# Patient Record
Sex: MALE | URBAN - METROPOLITAN AREA
[De-identification: names, ages, dates, MRNs, and addresses within clinical notes are randomized per-mention and may not be internally consistent; named-entity substitution may affect disease eponyms.]

---

## 2021-05-04 ENCOUNTER — OFFICE VISIT (OUTPATIENT)
Dept: URBAN - METROPOLITAN AREA CLINIC 7 | Facility: CLINIC | Age: 66
End: 2021-05-04
Payer: COMMERCIAL

## 2021-05-04 PROCEDURE — 92235 FLUORESCEIN ANGRPH MLTIFRAME: CPT | Performed by: OPHTHALMOLOGY

## 2021-05-04 PROCEDURE — 99204 OFFICE O/P NEW MOD 45 MIN: CPT | Performed by: OPHTHALMOLOGY

## 2021-05-04 PROCEDURE — 92134 CPTRZ OPH DX IMG PST SGM RTA: CPT | Performed by: OPHTHALMOLOGY

## 2021-05-04 RX ORDER — PREDNISOLONE ACETATE 10 MG/ML
1 % SUSPENSION/ DROPS OPHTHALMIC
Qty: 5 | Refills: 3 | Status: ACTIVE
Start: 2021-05-04

## 2021-05-04 RX ORDER — KETOROLAC TROMETHAMINE 5 MG/ML
0.5 % SOLUTION OPHTHALMIC
Qty: 5 | Refills: 3 | Status: INACTIVE
Start: 2021-05-04 | End: 2021-06-15

## 2021-05-04 ASSESSMENT — INTRAOCULAR PRESSURE
OD: 20
OS: 19

## 2021-05-04 NOTE — IMPRESSION/PLAN
Impression: Retinal hemorrhage, left eye: H35.62.  Plan: small juxtafoveal hemorrhage vs MA. observe

## 2021-05-04 NOTE — IMPRESSION/PLAN
Impression: Vitreous degeneration, right eye: H43.811. Plan: The patient has a Posterior Vitreous Detachment (PVD). At this time, no retinal tear or retinal detachment is identified. We discussed the natural history of a PVD. Retinal detachment symptoms were reviewed. Patient was encouraged to call our office if there is an increase in floaters, decrease in vision, or a shadow or curtain is noted in their peripheral vision. The patient has a Posterior Vitreous Detachment (PVD). At this time, no retinal tear or retinal detachment is identified. We discussed the natural history of a PVD. Retinal detachment symptoms were reviewed. Patient was encouraged to call our office if there is an increase in floaters, decrease in vision, or a shadow or curtain is noted in their peripheral vision.

## 2021-05-04 NOTE — IMPRESSION/PLAN
Impression: Retinal edema: H35.81. Right. 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 OCT shows CME/SRF OD and no IRF/SRF OS. FA shows CME OD and no leakage OS. We reviewed treatment options including observation, topical NSAID and corticosteroid eye drops, subtenon's steroid injections, intravitreal kenalog injections, and Avastin injections. We will begin conservatively with topical NSAID drops and corticosteroid eye drops. If the IOP is acceptable and significant CME remains, then we will consider a subtenon's or intravitreal kenalog injection at the patient's next appointment. thanks Thaddeus [facility] 4-6 weeks OCT OU; tanner

## 2021-06-15 ENCOUNTER — OFFICE VISIT (OUTPATIENT)
Dept: URBAN - METROPOLITAN AREA CLINIC 7 | Facility: CLINIC | Age: 66
End: 2021-06-15
Payer: COMMERCIAL

## 2021-06-15 DIAGNOSIS — H35.62 RETINAL HEMORRHAGE, LEFT EYE: ICD-10-CM

## 2021-06-15 DIAGNOSIS — H35.81 RETINAL EDEMA: Primary | ICD-10-CM

## 2021-06-15 PROCEDURE — 99213 OFFICE O/P EST LOW 20 MIN: CPT | Performed by: OPHTHALMOLOGY

## 2021-06-15 PROCEDURE — 92134 CPTRZ OPH DX IMG PST SGM RTA: CPT | Performed by: OPHTHALMOLOGY

## 2021-06-15 ASSESSMENT — INTRAOCULAR PRESSURE
OD: 8
OS: 7

## 2021-06-15 NOTE — IMPRESSION/PLAN
Impression: Retinal edema: H35.81. Right. Plan: He's doing well with nearly resolved edema on topical therapy OD. OCT shows trace SRF OD and no IRF/SRF OS. He will continue his drops for 3 more weeks and then taper off. He will f/u with your excellent care. thanks Thaddeus C PRN

## 2021-08-20 ENCOUNTER — OFFICE VISIT (OUTPATIENT)
Dept: URBAN - METROPOLITAN AREA CLINIC 13 | Facility: CLINIC | Age: 66
End: 2021-08-20
Payer: COMMERCIAL

## 2021-08-20 DIAGNOSIS — Z96.1 PRESENCE OF PSEUDOPHAKIA: ICD-10-CM

## 2021-08-20 PROCEDURE — 92134 CPTRZ OPH DX IMG PST SGM RTA: CPT | Performed by: OPHTHALMOLOGY

## 2021-08-20 PROCEDURE — 67515 INJECT/TREAT EYE SOCKET: CPT | Performed by: OPHTHALMOLOGY

## 2021-08-20 PROCEDURE — 99214 OFFICE O/P EST MOD 30 MIN: CPT | Performed by: OPHTHALMOLOGY

## 2021-08-20 ASSESSMENT — INTRAOCULAR PRESSURE
OS: 15
OD: 15

## 2021-08-20 NOTE — IMPRESSION/PLAN
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 Plan: --centered and stable IOL OU
--mild PCO OU
--followed by Dr. Catalina Claros

## 2021-08-20 NOTE — IMPRESSION/PLAN
Impression: Retinal edema: H35.81. Right. Plan: --exam/OCT reveal recurrent CME despite durezol/prolensa
--r/b/a of PSTK d/w patient in detail --risks include infection, pain, vision loss, glaucoma, cataract --pt elects PSTK OD, performed successfully w/o complication --pt instructed to call with s/s decreased vision/pain RTC 4 weeks for OCT OD re-eval for PSTK vs Triesence

## 2021-09-21 ENCOUNTER — OFFICE VISIT (OUTPATIENT)
Dept: URBAN - METROPOLITAN AREA CLINIC 7 | Facility: CLINIC | Age: 66
End: 2021-09-21
Payer: COMMERCIAL

## 2021-09-21 DIAGNOSIS — H43.811 VITREOUS DEGENERATION, RIGHT EYE: ICD-10-CM

## 2021-09-21 PROCEDURE — 99213 OFFICE O/P EST LOW 20 MIN: CPT | Performed by: OPHTHALMOLOGY

## 2021-09-21 PROCEDURE — 92134 CPTRZ OPH DX IMG PST SGM RTA: CPT | Performed by: OPHTHALMOLOGY

## 2021-09-21 ASSESSMENT — INTRAOCULAR PRESSURE
OD: 6
OS: 8

## 2021-12-16 ENCOUNTER — OFFICE VISIT (OUTPATIENT)
Dept: URBAN - METROPOLITAN AREA CLINIC 35 | Facility: CLINIC | Age: 66
End: 2021-12-16
Payer: COMMERCIAL

## 2021-12-16 PROCEDURE — 99213 OFFICE O/P EST LOW 20 MIN: CPT | Performed by: OPHTHALMOLOGY

## 2021-12-16 PROCEDURE — 92134 CPTRZ OPH DX IMG PST SGM RTA: CPT | Performed by: OPHTHALMOLOGY

## 2021-12-16 ASSESSMENT — INTRAOCULAR PRESSURE
OD: 15
OS: 13

## 2021-12-16 NOTE — IMPRESSION/PLAN
Impression: Retinal edema: H35.81. Right. Plan: He's doing well with resolved edema s/p STK injection OD 8/20/21 (). OCT shows no IRF/SRF OU. Observe. He will call with any changes. Otherwise, he will f/u with your excellent care. thanks Ricky's RTC  PRN